# Patient Record
(demographics unavailable — no encounter records)

---

## 2024-11-08 NOTE — PLAN
[FreeTextEntry1] : She was advised to take Cefuroxime 500 mg bid for 10 days and to continue taking Mucinex.  She will also continue using the Breztri daily and the albuterol as needed.  She was advised to follow up with me early next week if unimproved or sooner if symptoms worsen.

## 2024-11-08 NOTE — REVIEW OF SYSTEMS
[Fever] : no fever [Chills] : chills [Fatigue] : fatigue [Nasal Discharge] : nasal discharge [Postnasal Drip] : postnasal drip [Shortness Of Breath] : shortness of breath [Wheezing] : wheezing [Cough] : cough [Muscle Weakness] : muscle weakness [Muscle Pain] : muscle pain [Negative] : Heme/Lymph

## 2024-11-08 NOTE — PHYSICAL EXAM
[No Acute Distress] : no acute distress [Well Nourished] : well nourished [Well Developed] : well developed [Well-Appearing] : well-appearing [Normal Voice/Communication] : normal voice/communication [Normal Sclera/Conjunctiva] : normal sclera/conjunctiva [Normal Outer Ear/Nose] : the outer ears and nose were normal in appearance [Normal Oropharynx] : the oropharynx was normal [Normal TMs] : both tympanic membranes were normal [No Lymphadenopathy] : no lymphadenopathy [Supple] : supple [No Respiratory Distress] : no respiratory distress  [No Accessory Muscle Use] : no accessory muscle use [Normal Rate] : normal rate  [Regular Rhythm] : with a regular rhythm [Normal S1, S2] : normal S1 and S2 [Normal Mood] : the mood was normal [de-identified] : redness and swelling in nasal passages [de-identified] : expiratory wheezes bilaterally

## 2024-11-08 NOTE — HISTORY OF PRESENT ILLNESS
[FreeTextEntry8] : patient states cold symptoms stuffy and nauseous. She has a lot of mucous doesn't know if she has an infection, tried over the counter medication, covid test came back negative.  Her thermometer at home isn't working.  She is taking Mucinex. Her mucous is dark green.

## 2024-12-09 NOTE — PLAN
[FreeTextEntry1] : The lab results from our prior office visit were reviewed again.  For the stable hypertension, she will continue taking fosinopril 40 mg daily and HCTZ 12.5 mg daily.  For the stable hyperlipidemia she will continue taking atorvastatin 20 mg daily and for the stable diabetes she will continue taking glimepiride 1 mg daily. For the anxiety, I checked I-STOP and renewed diazepam 5 mg tid prn and she was advised to try to increase the escitalopram back to 5 mg daily.  Labs were ordered and will be drawn in the office today and she will follow up in 3 months or sooner prn.

## 2024-12-09 NOTE — HISTORY OF PRESENT ILLNESS
[FreeTextEntry1] :  Patient presents for 3 month follow up. [de-identified] : She reports that she is feeling better from our last visit.  She increased the escitalopram dose to 5 mg daily but had loose stools and reduced it back to 2/5 mg daily. She would like to renew diazepam today.

## 2025-01-07 NOTE — CURRENT MEDS
[Takes medication as prescribed] : does not take [None] : Patient does not have any barriers to medication adherence [Side Effects] :  side effects [Yes] : Reviewed medication list for presence of high-risk medications. [Benzodiazepines] : benzodiazepines

## 2025-01-07 NOTE — HISTORY OF PRESENT ILLNESS
[Home] : at home, [unfilled] , at the time of the visit. [Medical Office: ([facility])___] : at the medical office located in  [Verbal consent obtained from patient] : the patient, [unfilled] [FreeTextEntry8] : The patient telephoned and requested a call back to discuss their health.  The visit was performed over the telephone as the patient was unable to be seen in the office due to the COVID 19 pandemic.  She is still having loose stools and now they are brown and watery and are associated with mild abdominal cramping. She stopped the escitalopram a few weeks ago but it hasn't helped.  She is taking Imodium with temporary relief.

## 2025-01-07 NOTE — PLAN
[FreeTextEntry1] : She was advised that I will order testing for C diff since she was on an antibiotic in November.  We will fax the rx to [facility] in [place] and she will complete the testing and be contacted with the results.  She was advised to follow a BRAT and lactose free diet.

## 2025-03-03 NOTE — HISTORY OF PRESENT ILLNESS
[FreeTextEntry1] : Pt presents for 3M FU  [de-identified] : She saw GI for the diarrhea and had a normal CT scan except for thickening in the uterus.  Now her bowel movements are normal. She resumed the escitalopram 5 mg daily.  She is taking 1 diazepam daily.

## 2025-03-03 NOTE — PLAN
[FreeTextEntry1] : I reviewed the GI consultation and we were able to obtain the CAT scan report which I also reviewed.  I ordered a transvaginal and pelvic ultrasound to further evaluate the thickened endometrial lining, and she will be notified with the results.  For the anxiety she will continue taking escitalopram 5 mg daily and I checked I-stop and renewed diazepam 5 mg 3 times a day as needed.  The hypertension is stable, and she will continue taking fosinopril 40 mg daily and hydrochlorothiazide 12.5 mg daily.  For the stable diabetes she will continue taking glimepiride 1 mg daily and for the stable hyperlipidemia she will continue taking atorvastatin 20 mg nightly.  She was advised to follow-up in 3 months and lab testing will be done at that time.

## 2025-04-01 NOTE — PHYSICAL EXAM
[de-identified] : Examination of the right shoulder is as follows: INSPECTION: swelling, ecchymosis, but no erythema, no atrophy, no deformity, no scapular winging. PALPATION: tenderness at proximal humerus, but no AC joint tenderness, no trapezius tenderness, no mass(es) palpated. ROM: active forward flexion 15 degrees, active abduction 10 degrees, internal rotation lateral hip, external rotation 5 degrees. -motion is assessed: sitting -pain at end of ROM: severe STRENGTH: pain with strength testing, but forward flexion 3/5, abduction 3/5, external rotation 3/5, internal rotation 3/5 NEURO: motor and sensory intact distally.   X-rays of the right shoulder is as follows: Shoulder 2+ View AP/Lateral: mildly displaced, valgus impacted 3-part proximal humerus fracture, fracture is in acceptable position and alignment

## 2025-04-01 NOTE — ASSESSMENT
[FreeTextEntry1] : 82 yo RHD female presenting today with comminuted 3-part proximal humerus fracture. Recommend non-surgical management. -RUE NWB, d/c arm sling. Discussed with patient and patient's family that persistent use of arm sling may cause frozen shoulder, understanding expressed -Advised pendulum exercises daily -Advised OT/PT at facility -Avoid strenuous/impact related activities of RUE -Rest, ice, compression, elevation, NSAIDs PRN for pain. -All questions answered -F/u 1 month with repeat x-rays   The diagnosis was explained in detail. The potential non-surgical and surgical treatments were reviewed. The relative risks and benefits of each option were considered relative to the patients age, activity level, medical history, symptom severity and previously attempted treatments.   The patient was advised to consult with their primary medical provider prior to initiation of any new medications to reduce the risk of adverse effects specific to their long-term home medications and medical history.   The patient's current medications management of their orthopedic diagnosis was reviewed today:   1) We discussed a comprehensive treatment plan that included possible pharmaceutical management involving the use of prescription strength medications including but not limited to options as oral Naprosyn 500mg BID, once daily Meloxicam 15 mg, or 500-650 mg Tylenol versus over the counter oral medications and topical prescriptions NSAID Pennsaid vs over the counter Voltaren gel.  2) There is a moderate risk of morbidity with further treatment, especially from use of prescription strength medications and possible side effects of these medications which include upset stomach with oral medications, skin reactions to topical medications and cardiac/renal issues with long term use.  3) I recommended that the patient follow-up with their medical physician to discuss any significant specific potential issues with long term medication use such as interactions with current medications or with exacerbation of underlying medical comorbidities.  4) The benefits and risks associated with use of injectable, oral or topical, prescription and over the counter anti-inflammatory medications were discussed with the patient. The patient voiced understanding of the risks including but not limited to bleeding, stroke, kidney dysfunction, heart disease, and were referred to the black box warning label for further information  Entered by Jony Carolina PA-C acting as scribe. Dr. Sims Attestation The documentation recorded by the scribe, in my presence, accurately reflects the service I, Dr. Sims, personally performed, and the decisions made by me with my edits as appropriate.

## 2025-04-01 NOTE — HISTORY OF PRESENT ILLNESS
[Sudden] : sudden [Dull/Aching] : dull/aching [Throbbing] : throbbing [Intermittent] : intermittent [Household chores] : household chores [Leisure] : leisure [Social interactions] : social interactions [Rest] : rest [Retired] : Work status: retired [de-identified] : Patient here for right shoulder. Patient had x-ray at [facility] on 3/17/25. Patient states she fell at home. Patient states that she has a constant sharp pain in her right shoulder that is worse with movement. Patient is taking Oxy 5 and Tylenol PRN with relief. Patient came into the office accompanied by her daughter, son, and aid. [] : Post Surgical Visit: no [FreeTextEntry1] : Right shoulder  [de-identified] : Movement

## 2025-04-16 NOTE — PLAN
[FreeTextEntry1] : I reviewed the hospital records.  I prescribed mupirocin ointment for the nasal staph infection.  She will follow up with the orthopedist as scheduled and with me in June as scheduled or sooner prn.  For the stable hypertension she will continue taking fosinopril 40 mg daily and HCTZ 12.5 mg daily.

## 2025-04-16 NOTE — PHYSICAL EXAM
[No Acute Distress] : no acute distress [Well Nourished] : well nourished [Well Developed] : well developed [Well-Appearing] : well-appearing [Normal Voice/Communication] : normal voice/communication [No Respiratory Distress] : no respiratory distress  [No Accessory Muscle Use] : no accessory muscle use [Clear to Auscultation] : lungs were clear to auscultation bilaterally [Normal Rate] : normal rate  [Regular Rhythm] : with a regular rhythm [Normal S1, S2] : normal S1 and S2 [No Edema] : there was no peripheral edema [Normal Mood] : the mood was normal [de-identified] : RUE in a sling [de-identified] : walking well with a cane

## 2025-04-16 NOTE — HISTORY OF PRESENT ILLNESS
[Post-hospitalization from ___ Hospital] : Post-hospitalization from [unfilled] Hospital [Admitted on: ___] : The patient was admitted on [unfilled] [Discharged on ___] : discharged on [unfilled] [FreeTextEntry2] : She fell at home and fracture her humerus. She was sent to [facility] for rehab.  She still has some discomfort in the right upper arm.  The diuretic was stopped, and she was started on amlodipine.  Since going home, she went back on her regular medications.  She was prescribed mupirocin ointment at [facility] for a staph infection in her nose, but it wasn't continued at rehab, and she would like to have a new rx.  The diarrhea resolved and she's taking stool softeners.

## 2025-04-17 NOTE — HISTORY OF PRESENT ILLNESS
[FreeTextEntry1] : LMP:   Pt is a 82 yo referred by Dr. Wilson for consultation regarding thickened endometrium.  She was recently evaluated by gastroenterology for frequent diarrhea. A CT scan, ordered during the GI workup, incidentally revealed endometrial prominence measuring 0.9 cm. The patient subsequently followed up with her PCP, who ordered a transvaginal/pelvic ultrasound (report below) and referred her to gynecology. Pt has a hx of breast cancer and underwent a left partial mastectomy in 2009 at [facility]. pt also had radiation and chemotherapy.  (Records not available).   ABD/Pelvis CT (2/7/25) ZP: Mild wall thickening of 2 adjacent diverticula of the descending colon, with subtle prominence of the adjacent fascia. A very mild diverticulitis is not excluded. Suggestion of endometrial prominence to 0.9 cm, not well assessed on CT. Further evaluation with transvaginal pelvic ultrasound is recommended.  Pelvic/TVUS (3/4/25) ZP:  Uterus: AV, 6.3 cm X 3.9 cm X 2.7 cm Uterine Texture: Fundal cyst 0.5 cm. Endometrium: 1.3 cm in thickness, which is thickened with cystic changes. ROV: Not visualized. LOV: 1.9 cm x 2.2 cm x 1.9 cm 1. Complicated cyst 1.4 x 1.3 x 1.3 cm [place]: No FF   Health maintenance Lpap- Lhpv- Lmammo (6/7/24) SB: wnl, BR2 Lcolonoscopy-   PObHx: PGynHx:   PMH: Breast CA, DM2, [place] w/hearing aids, anxiety PSH: Lbreast lumpectomy, cataract removal FamHx: Aunt-Breast CA. Uncle- Colon CA SocialHx: Former smoker, no drug.  Medications: Allergies:

## 2025-04-17 NOTE — REASON FOR VISIT
[Consultation] : consultation for [FreeTextEntry2] : thicken endometrium  [FreeTextEntry1] : Dr Carley Wilson

## 2025-04-17 NOTE — REASON FOR VISIT
Denies known Latex allergy or symptoms of Latex sensitivity.    Medications reviewed and updated.     [Consultation] : consultation for [FreeTextEntry2] : thicken endometrium  [FreeTextEntry1] : Dr Carley Wilson

## 2025-04-17 NOTE — HISTORY OF PRESENT ILLNESS
[FreeTextEntry1] : LMP:   Pt is a 84 yo referred by Dr. Wilson for consultation regarding thickened endometrium.  She was recently evaluated by gastroenterology for frequent diarrhea. A CT scan, ordered during the GI workup, incidentally revealed endometrial prominence measuring 0.9 cm. The patient subsequently followed up with her PCP, who ordered a transvaginal/pelvic ultrasound (report below) and referred her to gynecology. Pt has a hx of breast cancer and underwent a left partial mastectomy in 2009 at [facility]. pt also had radiation and chemotherapy.  (Records not available).   ABD/Pelvis CT (2/7/25) ZP: Mild wall thickening of 2 adjacent diverticula of the descending colon, with subtle prominence of the adjacent fascia. A very mild diverticulitis is not excluded. Suggestion of endometrial prominence to 0.9 cm, not well assessed on CT. Further evaluation with transvaginal pelvic ultrasound is recommended.  Pelvic/TVUS (3/4/25) ZP:  Uterus: AV, 6.3 cm X 3.9 cm X 2.7 cm Uterine Texture: Fundal cyst 0.5 cm. Endometrium: 1.3 cm in thickness, which is thickened with cystic changes. ROV: Not visualized. LOV: 1.9 cm x 2.2 cm x 1.9 cm 1. Complicated cyst 1.4 x 1.3 x 1.3 cm [place]: No FF   Health maintenance Lpap- Lhpv- Lmammo (6/7/24) SB: wnl, BR2 Lcolonoscopy-   PObHx: PGynHx:   PMH: Breast CA, DM2, [place] w/hearing aids, anxiety PSH: Lbreast lumpectomy, cataract removal FamHx: Aunt-Breast CA. Uncle- Colon CA SocialHx: Former smoker, no drug.  Medications: Allergies:

## 2025-05-06 NOTE — PHYSICAL EXAM
[de-identified] : Examination of the right shoulder is as follows: INSPECTION: swelling, but no erythema, no atrophy, no deformity, no scapular winging, no ecchymosis PALPATION: tenderness at proximal humerus, but no AC joint tenderness, no trapezius tenderness, no mass(es) palpated. ROM: active forward flexion 100 degrees, active abduction 90 degrees, internal rotation L2 external [id number] degrees. -motion is assessed: standing -pain at end of ROM: moderate STRENGTH: pain with strength testing, but forward flexion 3/5, abduction 3/5, external rotation 3/5, internal rotation 3/5 NEURO: motor and sensory intact distally.   X-rays of the right shoulder is as follows: Shoulder 2+ View AP/Lateral: valgus impacted valgus impacted 3-part proximal humerus fracture, fracture is in acceptable position and alignment with increased callous formation at fracture site

## 2025-05-06 NOTE — ASSESSMENT
[FreeTextEntry1] : 84 yo RHD female presenting today for f/u of right comminuted 3-part proximal humerus fracture treated non-surgically. Recommend continued non-surgical management. Patient's daughter present for today's exam. -RUE WBAT -Rx PT given today -Continue to avoid strenuous/impact related activities of RUE -Discussed with patient with or without surgery she may have persistent stiffness of the right shoulder, understanding expressed -Rest, ice, compression, elevation, NSAIDs PRN for pain. -All questions answered -F/u 7 weeks with repeat x-rays   The diagnosis was explained in detail. The potential non-surgical and surgical treatments were reviewed. The relative risks and benefits of each option were considered relative to the patients age, activity level, medical history, symptom severity and previously attempted treatments.   The patient was advised to consult with their primary medical provider prior to initiation of any new medications to reduce the risk of adverse effects specific to their long-term home medications and medical history.   The patient's current medications management of their orthopedic diagnosis was reviewed today:   1) We discussed a comprehensive treatment plan that included possible pharmaceutical management involving the use of prescription strength medications including but not limited to options as oral Naprosyn 500mg BID, once daily Meloxicam 15 mg, or 500-650 mg Tylenol versus over-the-counter oral medications and topical prescriptions NSAID Pennsaid vs over the counter Voltaren gel.  2) There is a moderate risk of morbidity with further treatment, especially from use of prescription strength medications and possible side effects of these medications which include upset stomach with oral medications, skin reactions to topical medications and cardiac/renal issues with long term use.  3) I recommended that the patient follow-up with their medical physician to discuss any significant specific potential issues with long term medication use such as interactions with current medications or with exacerbation of underlying medical comorbidities.  4) The benefits and risks associated with use of injectable, oral or topical, prescription and over the counter anti-inflammatory medications were discussed with the patient. The patient voiced understanding of the risks including but not limited to bleeding, stroke, kidney dysfunction, heart disease, and were referred to the black box warning label for further information  Entered by Jony Carolina PA-C acting as scribe. Dr. Sims Attestation The documentation recorded by the scribe, in my presence, accurately reflects the service I, Dr. Sims, personally performed, and the decisions made by me with my edits as appropriate.

## 2025-05-06 NOTE — HISTORY OF PRESENT ILLNESS
[Sudden] : sudden [Dull/Aching] : dull/aching [Throbbing] : throbbing [Intermittent] : intermittent [Household chores] : household chores [Leisure] : leisure [Social interactions] : social interactions [Rest] : rest [Retired] : Work status: retired [7] : 7 [5] : 5 [de-identified] : Patient here for right shoulder. Patient being treated for comminuted 3-part proximal humerus fracture. Patient states that she has intermittent sharp pain over her lateral shoulder that is worse with movement. Reports that she is receiving OT at home. States that she is taking Tylenol PRN for pain with relief. FX coded 4/1/25 [] : Post Surgical Visit: no [FreeTextEntry1] : Right shoulder  [de-identified] : Movement

## 2025-06-04 NOTE — REVIEW OF SYSTEMS
[Wheezing] : wheezing [Cough] : cough [Diarrhea] : diarrhea [Unsteady Walking] : ataxia [Negative] : Heme/Lymph

## 2025-06-04 NOTE — PLAN
[FreeTextEntry1] : I reviewed the ER note including lab and radiology reports.  For the stable anxiety, she will continue taking escitalopram 2.5 mg daily and diazepam 5 mg tid prn.  For the stable hypertension, she will continue taking fosinopril 40 mg daily and HCTZ 12.5 mg daily. For the stable diabetes she will continue taking glimepiride 1 mg daily.  I ordered an A1C to follow up on the diabetes as well as urine testing for creatinine and albumin to rule out nephropathy.  I ordered a CBC to rule out anemia, TSH to evaluate her thyroid status, CMP to evaluate her kidney and liver function and lipids to check her lipid status.  She was advised to follow up in 3 months for a well visit or sooner prn.

## 2025-06-04 NOTE — HISTORY OF PRESENT ILLNESS
[FreeTextEntry1] : Patient presents for follow up Scheduled mammogram 6/9/25 [de-identified] : She had rectal bleeding and was evaluated in the ER and was found to have a touch of pneumonia and was prescribed doxycycline.  She saw the GI doctor for the diarrhea but has since figured out that it's due to the filtered water from her kitchen sink.  She has noticed some improvement in the breathing.  She wants to see a new pulmonologist.  She decreased the escitalopram to 2.5 mg daily.

## 2025-06-24 NOTE — HISTORY OF PRESENT ILLNESS
[Sudden] : sudden [3] : 3 [0] : 0 [Dull/Aching] : dull/aching [Throbbing] : throbbing [Intermittent] : intermittent [Household chores] : household chores [Leisure] : leisure [Social interactions] : social interactions [Rest] : rest [Retired] : Work status: retired [de-identified] : Patient here for right shoulder. Patient being treated for comminuted 3-part proximal humerus fracture. Patient states that she has pain only if she brings her arm up to high then she has aching pain. Patient states she stopped PT approx 2 weeks ago.. States that she is taking Tylenol PRN for pain with relief. FX coded 4/1/25 [] : Post Surgical Visit: no [FreeTextEntry1] : Right shoulder  [de-identified] : Movement

## 2025-06-24 NOTE — ASSESSMENT
[FreeTextEntry1] : 82 yo RHD female presenting today for f/u of right comminuted 3-part proximal humerus fracture treated non-surgically. X-rays today demonstrating increased callous formation at fracture site, fracture is fully healed. Recommend continued non-surgical management. Patient's daughter present for today's exam. -RUE WBAT -Advised HEP to increase strength in shoulder -Activities as tolerated, no restrictions -Discussed with patient with or without surgery she may have persistent stiffness of the right shoulder, understanding expressed -Rest, ice, compression, elevation, NSAIDs PRN for pain. -All questions answered -F/u PRN   The diagnosis was explained in detail. The potential non-surgical and surgical treatments were reviewed. The relative risks and benefits of each option were considered relative to the patients age, activity level, medical history, symptom severity and previously attempted treatments.   The patient was advised to consult with their primary medical provider prior to initiation of any new medications to reduce the risk of adverse effects specific to their long-term home medications and medical history.   The patient's current medications management of their orthopedic diagnosis was reviewed today:   1) We discussed a comprehensive treatment plan that included possible pharmaceutical management involving the use of prescription strength medications including but not limited to options as oral Naprosyn 500mg BID, once daily Meloxicam 15 mg, or 500-650 mg Tylenol versus over-the-counter oral medications and topical prescriptions NSAID Pennsaid vs over the counter Voltaren gel.  2) There is a moderate risk of morbidity with further treatment, especially from use of prescription strength medications and possible side effects of these medications which include upset stomach with oral medications, skin reactions to topical medications and cardiac/renal issues with long term use.  3) I recommended that the patient follow-up with their medical physician to discuss any significant specific potential issues with long term medication use such as interactions with current medications or with exacerbation of underlying medical comorbidities.  4) The benefits and risks associated with use of injectable, oral or topical, prescription and over the counter anti-inflammatory medications were discussed with the patient. The patient voiced understanding of the risks including but not limited to bleeding, stroke, kidney dysfunction, heart disease, and were referred to the black box warning label for further information  Entered by Jony Carolina PA-C acting as scribe. Dr. Sims Attestation The documentation recorded by the scribe, in my presence, accurately reflects the service I, Dr. Sims, personally performed, and the decisions made by me with my edits as appropriate.

## 2025-06-24 NOTE — PHYSICAL EXAM
[de-identified] : Examination of the right shoulder is as follows: INSPECTION: no swelling, no erythema, no atrophy, no deformity, no scapular winging, no ecchymosis PALPATION: no tenderness at proximal humerus, no AC joint tenderness, no trapezius tenderness, no mass(es) palpated. ROM: active forward flexion 140 degrees, active abduction 90 degrees, internal rotation L1 external rotation 40 degrees. -motion is assessed: standing -pain at end of ROM: no pain STRENGTH: pain with strength testing, but forward flexion 5/5, abduction 5/5, external rotation 5/5, internal rotation 5/5 NEURO: motor and sensory intact distally.   X-rays of the right shoulder is as follows: Shoulder 2+ View AP/Lateral: valgus impacted valgus impacted 3-part proximal humerus fracture, fracture is in acceptable position and alignment with increased callous formation at fracture site, fracture is fully healed

## 2025-07-10 NOTE — PLAN
[FreeTextEntry1] : 84 yo with episode of PMB, thickened endometrium. Well woman exam.  1) Annual:  Pap + HPV Mammogram up to date Colonoscopy: over age 75, declines DEXA: declines  2) PMB, Thickened endometrium:  Bleeding has resolved Plan for office endometrial evaluation, possible polypectomy if present Place misoprostol 200mcg night before procedure, Rx sent, instructed on use  RTO for procedure

## 2025-07-10 NOTE — PHYSICAL EXAM
[MA] : MA [Appropriately responsive] : appropriately responsive [Alert] : alert [No Acute Distress] : no acute distress [No Lymphadenopathy] : no lymphadenopathy [Soft] : soft [Non-tender] : non-tender [Non-distended] : non-distended [No HSM] : No HSM [No Lesions] : no lesions [No Mass] : no mass [Oriented x3] : oriented x3 [Examination Of The Breasts] : a normal appearance [___] : a [unfilled] ~Ucm mastectomy scar [No Masses] : no breast masses were palpable [Labia Majora] : normal [Labia Minora] : normal [Atrophy] : atrophy [Cystocele] : a cystocele [Normal] : normal [Uterine Adnexae] : normal [FreeTextEntry2] : Nicol Lockett  [FreeTextEntry5] : very posterior

## 2025-07-10 NOTE — HISTORY OF PRESENT ILLNESS
[FreeTextEntry1] : Pt is a 84 y/o here today to discuss management of thickened endometrium found on recent imaging. Menopause 50s. States had episode of bleeding in April, unsure if rectal or vaginal, went to [place] [facility]. Denies pelvic pain.   Also for well woman exam today, has not seen GYN in many years.    PELVIC WITH TRANSVAGINAL ULTRASOUND: BON 3/4/2025 COMPARISON: ABDOMINAL CT SCAN 2/2025 FINDINGS: UTERUS:  Anteverted Size: 6.3 cm x 3.9 cm x 2.7 cm Volume: 34.4 ml Uterine Texture: Fundal cyst 0.5 cm Endometrium:: 1.3 cm in thickness, which is thickened with cystic changes. RIGHT OVARY: Not visualized No adnexal masses or significant cysts LEFT OVARY: Size: 1.9 cm x 2.2 cm x 1.9 cm  Complicated cyst 1.4 x 1.3 x 1.3 cm No adnexal masses or significant cysts [place]: No free fluid IMPRESSION: 1. Endometrial lining thickening with cystic changes; endometrial sampling is advised. 2. Left ovarian complicated cyst 1.4 cm; six- month follow up  CT ABDOMEN AND PELVIS W-O IV/W IV : [facility]  Apr 25 2025 CLINICAL INFORMATION: Dark stool COMPARISON: None. CONTRAST/COMPLICATIONS: IV Contrast: Omnipaque 350  90 cc administered   10 cc discarded Oral Contrast: NONE PROCEDURE: CT of the Abdomen and Pelvis was performed. Precontrast, Arterial and Delayed phases were performed. Sagittal and coronal reformats were performed. FINDINGS: LOWER CHEST: Coronary artery calcification. Nonspecific patchy groundglass opacities in the right middle right lower lobe. Correlate for infection. Small hiatal hernia LIVER: Within normal limits. BILE DUCTS: Normal caliber. GALLBLADDER: Cholelithiasis. SPLEEN: Within normal limits. PANCREAS: Partially fatty replaced ADRENALS: Mild bilateral adrenal thickening KIDNEYS/URETERS: No hydronephrosis. Bilateral cortical thinning and scarring. Bilateral too small to characterize cortical hypodensities BLADDER: Within normal limits. REPRODUCTIVE ORGANS: No gynecologic mass BOWEL: No scan evidence of an active GI bleeding site. Pancolonic diverticulosis without acute diverticulitis. Large periampullary duodenal diverticulum No bowel obstruction. Appendix no appendicitis PERITONEUM/RETROPERITONEUM: Within normal limits. VESSELS: Atherosclerotic, nonaneurysmal LYMPH NODES: No lymphadenopathy. ABDOMINAL WALL: Within normal limits. BONES: Senescent changes. IMPRESSION: No scan evidence of an active GI bleeding site Pancolonic diverticulosis without acute diverticulitis Incidental patchy groundglass opacities in the right middle and right lower lobe. Correlate for infection  Last pap: 2005 - Normal Last mammogram: 2025 - Normal Last colonoscopy - 2010 - Normal Last DEXA: 2010 - Normal  PObHx: NSVDx4 PGynHx: Denies cysts, fibroids/denies abnormal pap/denies STI  Sexually Active: No PMH: HTN, Left Breast Ca (2009), Melanoma x 2, DM, Right humerus fx PSH: Left Lumpectomy, Melanoma removal  All: Aromisin - joint pain, Arimadex- stiff joints, Neosporin- rash Meds: Escitalopram oxalate 5 mg daily, Fosinopril 40 mg daily, HCTZ 12.5 mg daily, Glimepiride 1 mg daily, Vitamin B12 1000 mcg daily, Vitamin D3 25 mcg daily, Budesonide, glycopyrrolate, and formoterol inhaler - Inhale 2 puffs every 4 to 6 hours PRN, Diazepam 5 mg TID PRN, Atorvastatin 20 mg daily,  FamHx: Breast Ca - maternal Aunt Heart Disease- Father Social Hx: Former smoker quit in 2000/ no tob/ drug/ETOH

## 2025-07-10 NOTE — HISTORY OF PRESENT ILLNESS
[FreeTextEntry1] : Pt is a 84 y/o here today to discuss management of thickened endometrium found on recent imaging. Menopause 50s. States had episode of bleeding in April, unsure if rectal or vaginal, went to [place] [facility]. Denies pelvic pain.   Also for well woman exam today, has not seen GYN in many years.    PELVIC WITH TRANSVAGINAL ULTRASOUND: BON 3/4/2025 COMPARISON: ABDOMINAL CT SCAN 2/2025 FINDINGS: UTERUS:  Anteverted Size: 6.3 cm x 3.9 cm x 2.7 cm Volume: 34.4 ml Uterine Texture: Fundal cyst 0.5 cm Endometrium:: 1.3 cm in thickness, which is thickened with cystic changes. RIGHT OVARY: Not visualized No adnexal masses or significant cysts LEFT OVARY: Size: 1.9 cm x 2.2 cm x 1.9 cm  Complicated cyst 1.4 x 1.3 x 1.3 cm No adnexal masses or significant cysts [place]: No free fluid IMPRESSION: 1. Endometrial lining thickening with cystic changes; endometrial sampling is advised. 2. Left ovarian complicated cyst 1.4 cm; six- month follow up  CT ABDOMEN AND PELVIS W-O IV/W IV : [facility]  Apr 25 2025 CLINICAL INFORMATION: Dark stool COMPARISON: None. CONTRAST/COMPLICATIONS: IV Contrast: Omnipaque 350  90 cc administered   10 cc discarded Oral Contrast: NONE PROCEDURE: CT of the Abdomen and Pelvis was performed. Precontrast, Arterial and Delayed phases were performed. Sagittal and coronal reformats were performed. FINDINGS: LOWER CHEST: Coronary artery calcification. Nonspecific patchy groundglass opacities in the right middle right lower lobe. Correlate for infection. Small hiatal hernia LIVER: Within normal limits. BILE DUCTS: Normal caliber. GALLBLADDER: Cholelithiasis. SPLEEN: Within normal limits. PANCREAS: Partially fatty replaced ADRENALS: Mild bilateral adrenal thickening KIDNEYS/URETERS: No hydronephrosis. Bilateral cortical thinning and scarring. Bilateral too small to characterize cortical hypodensities BLADDER: Within normal limits. REPRODUCTIVE ORGANS: No gynecologic mass BOWEL: No scan evidence of an active GI bleeding site. Pancolonic diverticulosis without acute diverticulitis. Large periampullary duodenal diverticulum No bowel obstruction. Appendix no appendicitis PERITONEUM/RETROPERITONEUM: Within normal limits. VESSELS: Atherosclerotic, nonaneurysmal LYMPH NODES: No lymphadenopathy. ABDOMINAL WALL: Within normal limits. BONES: Senescent changes. IMPRESSION: No scan evidence of an active GI bleeding site Pancolonic diverticulosis without acute diverticulitis Incidental patchy groundglass opacities in the right middle and right lower lobe. Correlate for infection  Last pap: 2005 - Normal Last mammogram: 2025 - Normal Last colonoscopy - 2010 - Normal Last DEXA: 2010 - Normal  PObHx: NSVDx4 PGynHx: Denies cysts, fibroids/denies abnormal pap/denies STI  Sexually Active: No PMH: HTN, Left Breast Ca (2009), Melanoma x 2, DM, Right humerus fx PSH: Left Lumpectomy, Melanoma removal  All: Aromisin - joint pain, Arimadex- stiff joints, Neosporin- rash Meds: Escitalopram oxalate 5 mg daily, Fosinopril 40 mg daily, HCTZ 12.5 mg daily, Glimepiride 1 mg daily, Vitamin B12 1000 mcg daily, Vitamin D3 25 mcg daily, Budesonide, glycopyrrolate, and formoterol inhaler - Inhale 2 puffs every 4 to 6 hours PRN, Diazepam 5 mg TID PRN, Atorvastatin 20 mg daily,  FamHx: Breast Ca - maternal Aunt Heart Disease- Father Social Hx: Former smoker quit in 2000/ no tob/ drug/ETOH

## 2025-07-10 NOTE — HISTORY OF PRESENT ILLNESS
[FreeTextEntry1] : Pt is a 84 y/o here today to discuss management of thickened endometrium found on recent imaging. Menopause 50s. States had episode of bleeding in April, unsure if rectal or vaginal, went to [place] ED. Denies pelvic pain.   Also for well woman exam today, has not seen GYN in many years.    PELVIC WITH TRANSVAGINAL ULTRASOUND: BON 3/4/2025 COMPARISON: ABDOMINAL CT SCAN 2/2025 FINDINGS: UTERUS:  Anteverted Size: 6.3 cm x 3.9 cm x 2.7 cm Volume: 34.4 ml Uterine Texture: Fundal cyst 0.5 cm Endometrium:: 1.3 cm in thickness, which is thickened with cystic changes. RIGHT OVARY: Not visualized No adnexal masses or significant cysts LEFT OVARY: Size: 1.9 cm x 2.2 cm x 1.9 cm  Complicated cyst 1.4 x 1.3 x 1.3 cm No adnexal masses or significant cysts [place]: No free fluid IMPRESSION: 1. Endometrial lining thickening with cystic changes; endometrial sampling is advised. 2. Left ovarian complicated cyst 1.4 cm; six- month follow up  CT ABDOMEN AND PELVIS W-O IV/W IV : [facility]  Apr 25 2025 CLINICAL INFORMATION: Dark stool COMPARISON: None. CONTRAST/COMPLICATIONS: IV Contrast: Omnipaque 350  90 cc administered   10 cc discarded Oral Contrast: NONE PROCEDURE: CT of the Abdomen and Pelvis was performed. Precontrast, Arterial and Delayed phases were performed. Sagittal and coronal reformats were performed. FINDINGS: LOWER CHEST: Coronary artery calcification. Nonspecific patchy groundglass opacities in the right middle right lower lobe. Correlate for infection. Small hiatal hernia LIVER: Within normal limits. BILE DUCTS: Normal caliber. GALLBLADDER: Cholelithiasis. SPLEEN: Within normal limits. PANCREAS: Partially fatty replaced ADRENALS: Mild bilateral adrenal thickening KIDNEYS/URETERS: No hydronephrosis. Bilateral cortical thinning and scarring. Bilateral too small to characterize cortical hypodensities BLADDER: Within normal limits. REPRODUCTIVE ORGANS: No gynecologic mass BOWEL: No scan evidence of an active GI bleeding site. Pancolonic diverticulosis without acute diverticulitis. Large periampullary duodenal diverticulum No bowel obstruction. Appendix no appendicitis PERITONEUM/RETROPERITONEUM: Within normal limits. VESSELS: Atherosclerotic, nonaneurysmal LYMPH NODES: No lymphadenopathy. ABDOMINAL WALL: Within normal limits. BONES: Senescent changes. IMPRESSION: No scan evidence of an active GI bleeding site Pancolonic diverticulosis without acute diverticulitis Incidental patchy groundglass opacities in the right middle and right lower lobe. Correlate for infection  Last pap: 2005 - Normal Last mammogram: 2025 - Normal Last colonoscopy - 2010 - Normal Last DEXA: 2010 - Normal  PObHx: NSVDx4 PGynHx: Denies cysts, fibroids/denies abnormal pap/denies STI  Sexually Active: No PMH: HTN, Left Breast Ca (2009), Melanoma x 2, DM, Right humerus fx PSH: Left Lumpectomy, Melanoma removal  All: Aromisin - joint pain, Arimadex- stiff joints, Neosporin- rash Meds: Escitalopram oxalate 5 mg daily, Fosinopril 40 mg daily, HCTZ 12.5 mg daily, Glimepiride 1 mg daily, Vitamin B12 1000 mcg daily, Vitamin D3 25 mcg daily, Budesonide, glycopyrrolate, and formoterol inhaler - Inhale 2 puffs every 4 to 6 hours PRN, Diazepam 5 mg TID PRN, Atorvastatin 20 mg daily,  FamHx: Breast Ca - maternal Aunt Heart Disease- Father Social Hx: Former smoker quit in 2000/ no tob/ drug/ETOH

## 2025-07-10 NOTE — PLAN
[FreeTextEntry1] : 82 yo with episode of PMB, thickened endometrium. Well woman exam.  1) Annual:  Pap + HPV Mammogram up to date Colonoscopy: over age 75, declines DEXA: declines  2) PMB, Thickened endometrium:  Bleeding has resolved Plan for office endometrial evaluation, possible polypectomy if present Place misoprostol 200mcg night before procedure, Rx sent, instructed on use  RTO for procedure

## 2025-07-10 NOTE — REASON FOR VISIT
[Consultation] : consultation for [FreeTextEntry2] : thickened endometrium [FreeTextEntry1] : PCP Dr. Carley Wilson

## 2025-07-10 NOTE — HISTORY OF PRESENT ILLNESS
[FreeTextEntry1] : Pt is a 84 y/o here today to discuss management of thickened endometrium found on recent imaging. Menopause 50s. States had episode of bleeding in April, unsure if rectal or vaginal, went to [place] ED. Denies pelvic pain.   Also for well woman exam today, has not seen GYN in many years.    PELVIC WITH TRANSVAGINAL ULTRASOUND: BON 3/4/2025 COMPARISON: ABDOMINAL CT SCAN 2/2025 FINDINGS: UTERUS:  Anteverted Size: 6.3 cm x 3.9 cm x 2.7 cm Volume: 34.4 ml Uterine Texture: Fundal cyst 0.5 cm Endometrium:: 1.3 cm in thickness, which is thickened with cystic changes. RIGHT OVARY: Not visualized No adnexal masses or significant cysts LEFT OVARY: Size: 1.9 cm x 2.2 cm x 1.9 cm  Complicated cyst 1.4 x 1.3 x 1.3 cm No adnexal masses or significant cysts [place]: No free fluid IMPRESSION: 1. Endometrial lining thickening with cystic changes; endometrial sampling is advised. 2. Left ovarian complicated cyst 1.4 cm; six- month follow up  CT ABDOMEN AND PELVIS W-O IV/W IV : [facility]  Apr 25 2025 CLINICAL INFORMATION: Dark stool COMPARISON: None. CONTRAST/COMPLICATIONS: IV Contrast: Omnipaque 350  90 cc administered   10 cc discarded Oral Contrast: NONE PROCEDURE: CT of the Abdomen and Pelvis was performed. Precontrast, Arterial and Delayed phases were performed. Sagittal and coronal reformats were performed. FINDINGS: LOWER CHEST: Coronary artery calcification. Nonspecific patchy groundglass opacities in the right middle right lower lobe. Correlate for infection. Small hiatal hernia LIVER: Within normal limits. BILE DUCTS: Normal caliber. GALLBLADDER: Cholelithiasis. SPLEEN: Within normal limits. PANCREAS: Partially fatty replaced ADRENALS: Mild bilateral adrenal thickening KIDNEYS/URETERS: No hydronephrosis. Bilateral cortical thinning and scarring. Bilateral too small to characterize cortical hypodensities BLADDER: Within normal limits. REPRODUCTIVE ORGANS: No gynecologic mass BOWEL: No scan evidence of an active GI bleeding site. Pancolonic diverticulosis without acute diverticulitis. Large periampullary duodenal diverticulum No bowel obstruction. Appendix no appendicitis PERITONEUM/RETROPERITONEUM: Within normal limits. VESSELS: Atherosclerotic, nonaneurysmal LYMPH NODES: No lymphadenopathy. ABDOMINAL WALL: Within normal limits. BONES: Senescent changes. IMPRESSION: No scan evidence of an active GI bleeding site Pancolonic diverticulosis without acute diverticulitis Incidental patchy groundglass opacities in the right middle and right lower lobe. Correlate for infection  Last pap: 2005 - Normal Last mammogram: 2025 - Normal Last colonoscopy - 2010 - Normal Last DEXA: 2010 - Normal  PObHx: NSVDx4 PGynHx: Denies cysts, fibroids/denies abnormal pap/denies STI  Sexually Active: No PMH: HTN, Left Breast Ca (2009), Melanoma x 2, DM, Right humerus fx PSH: Left Lumpectomy, Melanoma removal  All: Aromisin - joint pain, Arimadex- stiff joints, Neosporin- rash Meds: Escitalopram oxalate 5 mg daily, Fosinopril 40 mg daily, HCTZ 12.5 mg daily, Glimepiride 1 mg daily, Vitamin B12 1000 mcg daily, Vitamin D3 25 mcg daily, Budesonide, glycopyrrolate, and formoterol inhaler - Inhale 2 puffs every 4 to 6 hours PRN, Diazepam 5 mg TID PRN, Atorvastatin 20 mg daily,  FamHx: Breast Ca - maternal Aunt Heart Disease- Father Social Hx: Former smoker quit in 2000/ no tob/ drug/ETOH

## 2025-07-17 NOTE — REVIEW OF SYSTEMS
[Shortness Of Breath] : shortness of breath [Dysuria] : dysuria [Frequency] : frequency [Fever] : no fever [Chills] : no chills [Chest Pain] : no chest pain [Hematuria] : no hematuria [FreeTextEntry6] : normal sob has hx copd

## 2025-07-17 NOTE — PHYSICAL EXAM
[No Lymphadenopathy] : no lymphadenopathy [Supple] : supple [No Accessory Muscle Use] : no accessory muscle use [Normal Rate] : normal rate  [Regular Rhythm] : with a regular rhythm [Normal S1, S2] : normal S1 and S2 [No Edema] : there was no peripheral edema [Normal] : soft, non-tender, non-distended, no masses palpated, no HSM and normal bowel sounds [No CVA Tenderness] : no CVA  tenderness [No Focal Deficits] : no focal deficits [Normal Affect] : the affect was normal [Normal Mood] : the mood was normal [Normal Insight/Judgement] : insight and judgment were intact [de-identified] : sounds tight b/l [de-identified] :  no guarding or rigidity  [de-identified] : left leg w/scab and mild surrounding erythema

## 2025-07-17 NOTE — HISTORY OF PRESENT ILLNESS
[FreeTextEntry8] : patient presents for UTI started 5 days go tried otc medication (cream) seemed okay and then it came back doesn't know if it helped  little bit of burning sensation declined itchiness just cramping  color of the urine - dark yellow not light at all   -c/o burning with urination, increase urinary urgency and frequency denies fevers,  chills, new low back pain    , chronic pelvic pain she took monistat for 7 days but the burning is back after urinating  she has a hx of utis in the past especially lately denies rash in vaginal area

## 2025-07-17 NOTE — PLAN
[FreeTextEntry1] : UTI -Urine dip-  small leuks  neg nitrites  neg blood -Urine cx sent -start augmentin 875-125mg po bid x 7 days take w/ food -Probiotic po daily -Increase fluids  left leg scab start mupirocin tid x 10 days has at home  advised if it doesnt resolve to see the dermatologist for evaluation   copd sounds tight on exam seen by pulmonary yesterday and will have pfts and f/u   abnormal pap and endometrial thickening seeing obgyn for endometrial biopsy and colposcopy   If fevers or chills advised to rto  ASAP , f/u 3 days if no relief,  pt agreed w/plan